# Patient Record
Sex: MALE | Race: WHITE | HISPANIC OR LATINO | URBAN - METROPOLITAN AREA
[De-identification: names, ages, dates, MRNs, and addresses within clinical notes are randomized per-mention and may not be internally consistent; named-entity substitution may affect disease eponyms.]

---

## 2021-06-23 ENCOUNTER — EMERGENCY (EMERGENCY)
Facility: HOSPITAL | Age: 81
LOS: 1 days | Discharge: HOME | End: 2021-06-23
Attending: EMERGENCY MEDICINE | Admitting: EMERGENCY MEDICINE
Payer: MEDICARE

## 2021-06-23 ENCOUNTER — INPATIENT (INPATIENT)
Facility: HOSPITAL | Age: 81
LOS: 1 days | Discharge: HOME | DRG: 343 | End: 2021-06-25
Attending: SURGERY | Admitting: SURGERY
Payer: MEDICARE

## 2021-06-23 VITALS
RESPIRATION RATE: 16 BRPM | TEMPERATURE: 98 F | DIASTOLIC BLOOD PRESSURE: 100 MMHG | SYSTOLIC BLOOD PRESSURE: 141 MMHG | HEIGHT: 67 IN | WEIGHT: 201.06 LBS | OXYGEN SATURATION: 98 % | HEART RATE: 106 BPM

## 2021-06-23 VITALS
OXYGEN SATURATION: 98 % | HEART RATE: 98 BPM | SYSTOLIC BLOOD PRESSURE: 158 MMHG | RESPIRATION RATE: 16 BRPM | TEMPERATURE: 99 F | DIASTOLIC BLOOD PRESSURE: 99 MMHG

## 2021-06-23 VITALS
OXYGEN SATURATION: 97 % | RESPIRATION RATE: 16 BRPM | DIASTOLIC BLOOD PRESSURE: 88 MMHG | HEART RATE: 103 BPM | HEIGHT: 67 IN | SYSTOLIC BLOOD PRESSURE: 135 MMHG | TEMPERATURE: 98 F | WEIGHT: 201.94 LBS

## 2021-06-23 DIAGNOSIS — Z85.46 PERSONAL HISTORY OF MALIGNANT NEOPLASM OF PROSTATE: Chronic | ICD-10-CM

## 2021-06-23 DIAGNOSIS — Z98.890 OTHER SPECIFIED POSTPROCEDURAL STATES: Chronic | ICD-10-CM

## 2021-06-23 DIAGNOSIS — E78.5 HYPERLIPIDEMIA, UNSPECIFIED: ICD-10-CM

## 2021-06-23 DIAGNOSIS — K35.30 ACUTE APPENDICITIS WITH LOCALIZED PERITONITIS, WITHOUT PERFORATION OR GANGRENE: ICD-10-CM

## 2021-06-23 DIAGNOSIS — R10.31 RIGHT LOWER QUADRANT PAIN: ICD-10-CM

## 2021-06-23 DIAGNOSIS — I10 ESSENTIAL (PRIMARY) HYPERTENSION: ICD-10-CM

## 2021-06-23 DIAGNOSIS — Z20.822 CONTACT WITH AND (SUSPECTED) EXPOSURE TO COVID-19: ICD-10-CM

## 2021-06-23 LAB
ALBUMIN SERPL ELPH-MCNC: 4.1 G/DL — SIGNIFICANT CHANGE UP (ref 3.4–5)
ALP SERPL-CCNC: 81 U/L — SIGNIFICANT CHANGE UP (ref 40–120)
ALT FLD-CCNC: 41 U/L — SIGNIFICANT CHANGE UP (ref 12–42)
ANION GAP SERPL CALC-SCNC: 8 MMOL/L — LOW (ref 9–16)
APPEARANCE UR: CLEAR — SIGNIFICANT CHANGE UP
APTT BLD: 42.4 SEC — HIGH (ref 27.5–35.5)
AST SERPL-CCNC: 23 U/L — SIGNIFICANT CHANGE UP (ref 15–37)
BILIRUB SERPL-MCNC: 0.8 MG/DL — SIGNIFICANT CHANGE UP (ref 0.2–1.2)
BILIRUB UR-MCNC: NEGATIVE — SIGNIFICANT CHANGE UP
BLD GP AB SCN SERPL QL: NEGATIVE — SIGNIFICANT CHANGE UP
BUN SERPL-MCNC: 16 MG/DL — SIGNIFICANT CHANGE UP (ref 7–23)
CALCIUM SERPL-MCNC: 9.3 MG/DL — SIGNIFICANT CHANGE UP (ref 8.5–10.5)
CHLORIDE SERPL-SCNC: 105 MMOL/L — SIGNIFICANT CHANGE UP (ref 96–108)
CO2 SERPL-SCNC: 28 MMOL/L — SIGNIFICANT CHANGE UP (ref 22–31)
COLOR SPEC: YELLOW — SIGNIFICANT CHANGE UP
CREAT SERPL-MCNC: 1.03 MG/DL — SIGNIFICANT CHANGE UP (ref 0.5–1.3)
DIFF PNL FLD: NEGATIVE — SIGNIFICANT CHANGE UP
GLUCOSE SERPL-MCNC: 105 MG/DL — HIGH (ref 70–99)
GLUCOSE UR QL: NEGATIVE — SIGNIFICANT CHANGE UP
HCT VFR BLD CALC: 53 % — HIGH (ref 39–50)
HGB BLD-MCNC: 17.8 G/DL — HIGH (ref 13–17)
INR BLD: 1.04 — SIGNIFICANT CHANGE UP (ref 0.88–1.16)
KETONES UR-MCNC: NEGATIVE — SIGNIFICANT CHANGE UP
LEUKOCYTE ESTERASE UR-ACNC: NEGATIVE — SIGNIFICANT CHANGE UP
MCHC RBC-ENTMCNC: 30.4 PG — SIGNIFICANT CHANGE UP (ref 27–34)
MCHC RBC-ENTMCNC: 33.6 GM/DL — SIGNIFICANT CHANGE UP (ref 32–36)
MCV RBC AUTO: 90.4 FL — SIGNIFICANT CHANGE UP (ref 80–100)
NITRITE UR-MCNC: NEGATIVE — SIGNIFICANT CHANGE UP
NRBC # BLD: 0 /100 WBCS — SIGNIFICANT CHANGE UP (ref 0–0)
PH UR: 6 — SIGNIFICANT CHANGE UP (ref 5–8)
PLATELET # BLD AUTO: 291 K/UL — SIGNIFICANT CHANGE UP (ref 150–400)
POTASSIUM SERPL-MCNC: 4.3 MMOL/L — SIGNIFICANT CHANGE UP (ref 3.5–5.3)
POTASSIUM SERPL-SCNC: 4.3 MMOL/L — SIGNIFICANT CHANGE UP (ref 3.5–5.3)
PROT SERPL-MCNC: 7.9 G/DL — SIGNIFICANT CHANGE UP (ref 6.4–8.2)
PROT UR-MCNC: NEGATIVE MG/DL — SIGNIFICANT CHANGE UP
PROTHROM AB SERPL-ACNC: 12.3 SEC — SIGNIFICANT CHANGE UP (ref 10.6–13.6)
RBC # BLD: 5.86 M/UL — HIGH (ref 4.2–5.8)
RBC # FLD: 15.1 % — HIGH (ref 10.3–14.5)
RH IG SCN BLD-IMP: POSITIVE — SIGNIFICANT CHANGE UP
RH IG SCN BLD-IMP: POSITIVE — SIGNIFICANT CHANGE UP
SARS-COV-2 RNA SPEC QL NAA+PROBE: SIGNIFICANT CHANGE UP
SODIUM SERPL-SCNC: 141 MMOL/L — SIGNIFICANT CHANGE UP (ref 132–145)
SP GR SPEC: <=1.005 — SIGNIFICANT CHANGE UP (ref 1–1.03)
UROBILINOGEN FLD QL: 0.2 E.U./DL — SIGNIFICANT CHANGE UP
WBC # BLD: 11.65 K/UL — HIGH (ref 3.8–10.5)
WBC # FLD AUTO: 11.65 K/UL — HIGH (ref 3.8–10.5)

## 2021-06-23 PROCEDURE — 99284 EMERGENCY DEPT VISIT MOD MDM: CPT

## 2021-06-23 PROCEDURE — 71045 X-RAY EXAM CHEST 1 VIEW: CPT | Mod: 26

## 2021-06-23 PROCEDURE — 99285 EMERGENCY DEPT VISIT HI MDM: CPT

## 2021-06-23 PROCEDURE — 74177 CT ABD & PELVIS W/CONTRAST: CPT | Mod: 26

## 2021-06-23 RX ORDER — SODIUM CHLORIDE 9 MG/ML
500 INJECTION INTRAMUSCULAR; INTRAVENOUS; SUBCUTANEOUS ONCE
Refills: 0 | Status: COMPLETED | OUTPATIENT
Start: 2021-06-23 | End: 2021-06-23

## 2021-06-23 RX ORDER — SODIUM CHLORIDE 9 MG/ML
1000 INJECTION, SOLUTION INTRAVENOUS
Refills: 0 | Status: DISCONTINUED | OUTPATIENT
Start: 2021-06-23 | End: 2021-06-25

## 2021-06-23 RX ORDER — METRONIDAZOLE 500 MG
TABLET ORAL
Refills: 0 | Status: DISCONTINUED | OUTPATIENT
Start: 2021-06-24 | End: 2021-06-24

## 2021-06-23 RX ORDER — CEFTRIAXONE 500 MG/1
1000 INJECTION, POWDER, FOR SOLUTION INTRAMUSCULAR; INTRAVENOUS ONCE
Refills: 0 | Status: COMPLETED | OUTPATIENT
Start: 2021-06-23 | End: 2021-06-23

## 2021-06-23 RX ORDER — PIPERACILLIN AND TAZOBACTAM 4; .5 G/20ML; G/20ML
3.38 INJECTION, POWDER, LYOPHILIZED, FOR SOLUTION INTRAVENOUS ONCE
Refills: 0 | Status: COMPLETED | OUTPATIENT
Start: 2021-06-23 | End: 2021-06-23

## 2021-06-23 RX ORDER — IRBESARTAN 75 MG/1
1 TABLET ORAL
Qty: 0 | Refills: 0 | DISCHARGE

## 2021-06-23 RX ORDER — IOHEXOL 300 MG/ML
30 INJECTION, SOLUTION INTRAVENOUS ONCE
Refills: 0 | Status: COMPLETED | OUTPATIENT
Start: 2021-06-23 | End: 2021-06-23

## 2021-06-23 RX ORDER — AMLODIPINE BESYLATE 2.5 MG/1
1 TABLET ORAL
Qty: 0 | Refills: 0 | DISCHARGE

## 2021-06-23 RX ORDER — HYDROMORPHONE HYDROCHLORIDE 2 MG/ML
0.5 INJECTION INTRAMUSCULAR; INTRAVENOUS; SUBCUTANEOUS EVERY 6 HOURS
Refills: 0 | Status: DISCONTINUED | OUTPATIENT
Start: 2021-06-23 | End: 2021-06-25

## 2021-06-23 RX ORDER — METRONIDAZOLE 500 MG
500 TABLET ORAL EVERY 8 HOURS
Refills: 0 | Status: DISCONTINUED | OUTPATIENT
Start: 2021-06-24 | End: 2021-06-24

## 2021-06-23 RX ORDER — ROSUVASTATIN CALCIUM 5 MG/1
1 TABLET ORAL
Qty: 0 | Refills: 0 | DISCHARGE

## 2021-06-23 RX ORDER — ACETAMINOPHEN 500 MG
650 TABLET ORAL EVERY 6 HOURS
Refills: 0 | Status: DISCONTINUED | OUTPATIENT
Start: 2021-06-23 | End: 2021-06-25

## 2021-06-23 RX ORDER — CEFTRIAXONE 500 MG/1
INJECTION, POWDER, FOR SOLUTION INTRAMUSCULAR; INTRAVENOUS
Refills: 0 | Status: DISCONTINUED | OUTPATIENT
Start: 2021-06-23 | End: 2021-06-24

## 2021-06-23 RX ORDER — CEFTRIAXONE 500 MG/1
1000 INJECTION, POWDER, FOR SOLUTION INTRAMUSCULAR; INTRAVENOUS EVERY 24 HOURS
Refills: 0 | Status: DISCONTINUED | OUTPATIENT
Start: 2021-06-24 | End: 2021-06-24

## 2021-06-23 RX ORDER — METRONIDAZOLE 500 MG
500 TABLET ORAL ONCE
Refills: 0 | Status: COMPLETED | OUTPATIENT
Start: 2021-06-23 | End: 2021-06-24

## 2021-06-23 RX ORDER — ONDANSETRON 8 MG/1
4 TABLET, FILM COATED ORAL EVERY 6 HOURS
Refills: 0 | Status: DISCONTINUED | OUTPATIENT
Start: 2021-06-23 | End: 2021-06-25

## 2021-06-23 RX ADMIN — SODIUM CHLORIDE 500 MILLILITER(S): 9 INJECTION INTRAMUSCULAR; INTRAVENOUS; SUBCUTANEOUS at 13:14

## 2021-06-23 RX ADMIN — IOHEXOL 30 MILLILITER(S): 300 INJECTION, SOLUTION INTRAVENOUS at 13:14

## 2021-06-23 RX ADMIN — PIPERACILLIN AND TAZOBACTAM 200 GRAM(S): 4; .5 INJECTION, POWDER, LYOPHILIZED, FOR SOLUTION INTRAVENOUS at 16:48

## 2021-06-23 NOTE — ED PROVIDER NOTE - PATIENT PORTAL LINK FT
You can access the FollowMyHealth Patient Portal offered by [facility] by registering at the following website: [email or web address]. By joining [facility]’s FollowMyHealth portal, you will also be able to view your health information using other applications (apps) compatible with our system.

## 2021-06-23 NOTE — ED PROVIDER NOTE - NSFOLLOWUPINSTRUCTIONS_ED_ALL_ED_FT
Please go directly to the ER at [address] between [place] and [facility].  Go right away.  You have appendicitis and if the appendix bursts, it could kill you.  If you can't go to [facility], go to the nearest ER.

## 2021-06-23 NOTE — ED PROVIDER NOTE - CLINICAL SUMMARY MEDICAL DECISION MAKING FREE TEXT BOX
Patient presents to the ED complaining of RLQ abdominal pain. Patient was told to come to the ED by Dr. Spencer. Patient presents to the ED complaining of RLQ abdominal pain. Patient was told to come to the ED by Dr. Spencer to evaluate for appendicitis. Will perform CT scan. Patient presents to the ED complaining of RLQ abdominal pain. Patient was told to come to the ED by Dr. Spencer to evaluate for appendicitis. Acute appendicitis on CT scan, patient states he must move his car and will go to [facility] afterward, patient lives in [place].  Risks and alternatives d/w patient and he understands the need for surgery and risk of death if he does not go to [facility] after dropping his car off and he states he will leave against medical advice.

## 2021-06-23 NOTE — ED ADULT NURSE NOTE - NSIMPLEMENTINTERV_GEN_ALL_ED
Implemented All Universal Safety Interventions:  [place] to call system. Call bell, personal items and telephone within reach. Instruct patient to call for assistance. Room bathroom lighting operational. Non-slip footwear when patient is off stretcher. Physically safe environment: no spills, clutter or unnecessary equipment. Stretcher in lowest position, wheels locked, appropriate side rails in place.

## 2021-06-23 NOTE — H&P ADULT - NSHPPHYSICALEXAM_GEN_ALL_CORE
VITALS:    T(F): 98.2 (06-23-21 @ 19:40), Max: 98.6 (06-23-21 @ 16:53)  HR: 106 (06-23-21 @ 19:40) (95 - 106)  BP: 141/100 (06-23-21 @ 19:40) (135/88 - 158/99)  RR: 16 (06-23-21 @ 19:40) (16 - 18)  SpO2: 98% (06-23-21 @ 19:40) (97% - 98%)  Wt(kg): --    I&O's Summary      GENERAL: NAD, Resting comfortably in bed, awake, opens eyes spontaneously  HEENT: NCAT, MMM, Normal conjunctiva, PERRL  RESP: Nonlabored breathing, No respiratory distress  CARD: Normal rate, Normal peripheral perfusion  GI: Soft, minimally distended, TTP in RLQ>LLQ. No Rovsing's. No guarding, No rebound tenderness  EXTREM: WWP, No edema, No gross deformity of extremities  SKIN: No rashes, no lesions  NEURO: AAOx3, No focal motor or sensory deficits  PSYCH: Affect and characteristics of appearance, verbalizations, and behaviors are appropriate

## 2021-06-23 NOTE — ED PROVIDER NOTE - OBJECTIVE STATEMENT
Patient presents to the ED complaining of RLQ abdominal pain. Patient was told to come to the ED by Dr. Spencer. Patient presents to the ED complaining of RLQ abdominal pain. Patient was told to come to the ED by Dr. Spencer to evaluate for appendicitis. 81 y/o male with PMHx of HTN presents to the ED complaining of RLQ abdominal pain. Patient was told to come to the ED by Dr. Spencer to evaluate for appendicitis. Patient has PSHx of hernia surgery at [facility] 1 year ago.

## 2021-06-23 NOTE — ED ADULT TRIAGE NOTE - CHIEF COMPLAINT QUOTE
Pt sent in from [facility] for confirmed appendicitis. Pt reports RLQ pain since last night. Denies n/v/d, fevers. Hx of prostate cancer.

## 2021-06-23 NOTE — ED ADULT NURSE NOTE - NSIMPLEMENTINTERV_GEN_ALL_ED
Implemented All Universal Safety Interventions:  [place] to call system. Call bell, personal items and telephone within reach. Instruct patient to call for assistance. Room bathroom lighting operational. Non-slip footwear when patient is off stretcher. Physically safe environment: no spills, clutter or unnecessary equipment. Stretcher in lowest position, wheels locked, appropriate side rails in place.

## 2021-06-23 NOTE — H&P ADULT - NSHPLABSRESULTS_GEN_ALL_CORE
LABS:                        17.8   11.65 )-----------( 291      ( 23 Jun 2021 13:11 )             53.0     06-23    141  |  105  |  16  ----------------------------<  105<H>  4.3   |  28  |  1.03    Ca    9.3      23 Jun 2021 13:35    TPro  7.9  /  Alb  4.1  /  TBili  0.8  /  DBili  x   /  AST  23  /  ALT  41  /  AlkPhos  81  06-23    PT/INR - ( 23 Jun 2021 13:35 )   PT: 12.3 sec;   INR: 1.04          PTT - ( 23 Jun 2021 13:35 )  PTT:42.4 sec  Urinalysis Basic - ( 23 Jun 2021 14:36 )      RADIOLOGY & ADDITIONAL TESTS:    Color: Yellow / Appearance: Clear / SG: <=1.005 / pH: x  Gluc: x / Ketone: NEGATIVE  / Bili: NEGATIVE / Urobili: 0.2 E.U./dL   Blood: x / Protein: NEGATIVE mg/dL / Nitrite: NEGATIVE   Leuk Esterase: NEGATIVE / RBC: x / WBC x   Sq Epi: x / Non Sq Epi: x / Bacteria: x

## 2021-06-23 NOTE — H&P ADULT - NSHPSOCIALHISTORY_GEN_ALL_CORE
1-2 drinks of wine/month, 1/2 ppd of cigarettes (quit 3 weeks ago), daily marijuana use (quit 3 weeks ago)

## 2021-06-23 NOTE — H&P ADULT - HISTORY OF PRESENT ILLNESS
INTERVAL HPI/OVERNIGHT EVENTS:  No acute events overnight.    VITALS:    T(F): 98.2 (06-23-21 @ 19:40), Max: 98.6 (06-23-21 @ 16:53)  HR: 106 (06-23-21 @ 19:40) (95 - 106)  BP: 141/100 (06-23-21 @ 19:40) (135/88 - 158/99)  RR: 16 (06-23-21 @ 19:40) (16 - 18)  SpO2: 98% (06-23-21 @ 19:40) (97% - 98%)  Wt(kg): --    I&O's Detail      MEDICATIONS:    ANTIBIOTICS:      PAIN CONTROL:       MEDS:      HEME/ONC        PHYSICAL EXAM:  General: No acute distress.  Alert and Oriented  Abdominal Exam:   Exam:      LABS:                        17.8   11.65 )-----------( 291      ( 23 Jun 2021 13:11 )             53.0     06-23    141  |  105  |  16  ----------------------------<  105<H>  4.3   |  28  |  1.03    Ca    9.3      23 Jun 2021 13:35    TPro  7.9  /  Alb  4.1  /  TBili  0.8  /  DBili  x   /  AST  23  /  ALT  41  /  AlkPhos  81  06-23    PT/INR - ( 23 Jun 2021 13:35 )   PT: 12.3 sec;   INR: 1.04          PTT - ( 23 Jun 2021 13:35 )  PTT:42.4 sec  Urinalysis Basic - ( 23 Jun 2021 14:36 )    Color: Yellow / Appearance: Clear / SG: <=1.005 / pH: x  Gluc: x / Ketone: NEGATIVE  / Bili: NEGATIVE / Urobili: 0.2 E.U./dL   Blood: x / Protein: NEGATIVE mg/dL / Nitrite: NEGATIVE   Leuk Esterase: NEGATIVE / RBC: x / WBC x   Sq Epi: x / Non Sq Epi: x / Bacteria: x        RADIOLOGY & ADDITIONAL TESTS:    ASSESSMENT: 80yMale s/p     PLAN:  Diet: clears  Pain control  Monitor Urine Output  DVT ppx  Cont Abx  OOB/IS   Jack Patel is a 80M with a pmh of HTN, HLD, COPD (not on home O2), diverticulitis (last attack 7 years ago) and prostate ca s/p total prostatectomy (~25years ago) and psh of umbilical hernia repair with mesh (2 years ago) who presents with 1 day history of RLQ pain with associated anorexia. Reports spontaneous onset of RLQ with radiation to groin yesterday evening while resting. Reports the pain as persistent and achy and persisted into the next morning prompting the patient to call his PCP. PCP advised the pt to visit the ED. Reports that he initially thought the pain was 2/2 to diverticulitis but more severe in nature. Last bowel movement was this AM and was soft; NBNB    VITALS:    T(F): 98.2 (06-23-21 @ 19:40), Max: 98.6 (06-23-21 @ 16:53)  HR: 106 (06-23-21 @ 19:40) (95 - 106)  BP: 141/100 (06-23-21 @ 19:40) (135/88 - 158/99)  RR: 16 (06-23-21 @ 19:40) (16 - 18)  SpO2: 98% (06-23-21 @ 19:40) (97% - 98%)  Wt(kg): --    I&O's Detail      MEDICATIONS:    ANTIBIOTICS:      PAIN CONTROL:       MEDS:      HEME/ONC        PHYSICAL EXAM:  General: No acute distress.  Alert and Oriented  Abdominal Exam:   Exam:      LABS:                        17.8   11.65 )-----------( 291      ( 23 Jun 2021 13:11 )             53.0     06-23    141  |  105  |  16  ----------------------------<  105<H>  4.3   |  28  |  1.03    Ca    9.3      23 Jun 2021 13:35    TPro  7.9  /  Alb  4.1  /  TBili  0.8  /  DBili  x   /  AST  23  /  ALT  41  /  AlkPhos  81  06-23    PT/INR - ( 23 Jun 2021 13:35 )   PT: 12.3 sec;   INR: 1.04          PTT - ( 23 Jun 2021 13:35 )  PTT:42.4 sec  Urinalysis Basic - ( 23 Jun 2021 14:36 )    Color: Yellow / Appearance: Clear / SG: <=1.005 / pH: x  Gluc: x / Ketone: NEGATIVE  / Bili: NEGATIVE / Urobili: 0.2 E.U./dL   Blood: x / Protein: NEGATIVE mg/dL / Nitrite: NEGATIVE   Leuk Esterase: NEGATIVE / RBC: x / WBC x   Sq Epi: x / Non Sq Epi: x / Bacteria: x        RADIOLOGY & ADDITIONAL TESTS:    ASSESSMENT: 80yMale s/p     PLAN:  Diet: clears  Pain control  Monitor Urine Output  DVT ppx  Cont Abx  OOB/IS   Jack Patel is a 80M with a pmh of HTN, HLD, COPD (not on home O2), diverticulitis (last attack 7 years ago) and prostate ca s/p total prostatectomy (~25years ago) and psh of umbilical hernia repair with mesh (2 years ago) who presents with 1 day history of RLQ pain with associated anorexia. Reports spontaneous onset of RLQ with radiation to groin yesterday evening while resting. Reports the pain as persistent and achy and persisted into the next morning prompting the patient to call his PCP. PCP advised the pt to visit the ED. Reports that he initially thought the pain was 2/2 to diverticulitis but more severe in nature. Last bowel movement was this AM and was soft; NBNB. Last colonoscopy was 2 years ago and was notable for 19 polyps that were removed. Denies any associated f/c, n/v, CP, SOB, melena, hematochezia, dysuria, weakness or pain in extremities.     In the ED initial vitals were notable for  otherwise afebrile with SBP wnl. Initial labs notable for WBC 11.6 and Hgb 17.8, otherwise remaining labs wnl. CT A/P notable for several small cysts and additional subcentimeter hypodensities which are too small to characterize. Mild ectasia of the distal descending thoracic aorta measuring up to 3.5 cm diameter. Two distal periesophageal nodes up to 0.7 cm diameter. Appendix is dilated measuring up to 1.1 cm in transverse diameter. There is mild periappendiceal fat stranding. No free air or abscess. There are several nonspecific mildly dilated loops of small bowel measuring up to 3.1 cm without a discrete transition point. Extensive colonic diverticulosis. Periampullary duodenal diverticulum. Was resuscitated with 1L NS and given 3.375mg of zosyn prior to arrival to [facility].    PMH: HTN, HLD, COPD (not on home O2), diverticulitis, and Prostate CA  PSH: Total prostatectomy (~25 years ago), Umbilical hernia repair (~2 years ago)  Allergies: NKDA                 Jack Patel is a 80M with a pmh of HTN, HLD, COPD (not on home O2), diverticulitis (last attack 7 years ago) and prostate ca s/p total prostatectomy (~25years ago) and psh of umbilical hernia repair with mesh (2 years ago) who presents with 1 day history of RLQ pain with associated anorexia. Reports spontaneous onset of RLQ with radiation to groin yesterday evening while resting. Reports the pain as persistent and achy and persisted into the next morning prompting the patient to call his PCP. PCP advised the pt to visit the ED. Reports that he initially thought the pain was 2/2 to diverticulitis but more severe in nature. Last bowel movement was this AM and was soft; NBNB. Last colonoscopy was 2 years ago and was notable for 19 polyps that were removed. Denies any associated f/c, night fevers, unintentional  n/v, CP, SOB, melena, hematochezia, dysuria, weakness or pain in extremities.     In the ED initial vitals were notable for  otherwise afebrile with SBP wnl. Initial labs notable for WBC 11.6 and Hgb 17.8, otherwise remaining labs wnl. CT A/P notable for several small cysts and additional subcentimeter hypodensities which are too small to characterize. Mild ectasia of the distal descending thoracic aorta measuring up to 3.5 cm diameter. Two distal periesophageal nodes up to 0.7 cm diameter. Appendix is dilated measuring up to 1.1 cm in transverse diameter. There is mild periappendiceal fat stranding. No free air or abscess. There are several nonspecific mildly dilated loops of small bowel measuring up to 3.1 cm without a discrete transition point. Extensive colonic diverticulosis. Periampullary duodenal diverticulum. Was resuscitated with 1L NS and given 3.375mg of zosyn prior to arrival to [facility].    PMH: HTN, HLD, COPD (not on home O2), diverticulitis, and Prostate CA  PSH: Total prostatectomy (~25 years ago), Umbilical hernia repair (~2 years ago)  Allergies: NKDA  SocHx: 1-2 drinks of wine/month, 1/2 ppd of cigarettes (quit 3 weeks ago), daily marijuana use (quit 3 weeks ago)  FamHx: No fam history of CRC, IBD, or IBS                 Jack Patel is a 80M with a pmh of HTN, HLD, COPD (not on home O2), diverticulitis (last attack 7 years ago) and prostate ca s/p total prostatectomy (~25years ago) and psh of umbilical hernia repair with mesh (2 years ago) who presents with 1 day history of RLQ pain with associated anorexia. Reports spontaneous onset of RLQ with radiation to groin yesterday evening while resting. Reports the pain as persistent and achy and persisted into the next morning prompting the patient to call his PCP. PCP advised the pt to visit the ED. Reports that he initially thought the pain was 2/2 to diverticulitis but more severe in nature. Last bowel movement was this AM and was soft; NBNB. Last colonoscopy was 2 years ago and was notable for 19 polyps that were removed. Denies any associated f/c, night fevers, unintentional  n/v, CP, SOB, melena, hematochezia, dysuria, weakness or pain in extremities.     In the ED initial vitals were notable for  otherwise afebrile with SBP wnl. Initial labs notable for WBC 11.6 and Hgb 17.8, otherwise remaining labs wnl. CT A/P notable for several small cysts and additional subcentimeter hypodensities which are too small to characterize. Mild ectasia of the distal descending thoracic aorta measuring up to 3.5 cm diameter. Two distal periesophageal nodes up to 0.7 cm diameter. Appendix is dilated measuring up to 1.1 cm in transverse diameter. There is mild periappendiceal fat stranding. No free air or abscess. There are several nonspecific mildly dilated loops of small bowel measuring up to 3.1 cm without a discrete transition point. Extensive colonic diverticulosis. Periampullary duodenal diverticulum. Was resuscitated with 1L NS and given 3.375mg of zosyn prior to arrival to [facility].    PMH: HTN, HLD, COPD (not on home O2), diverticulitis, and Prostate CA  PSH: Total prostatectomy (~25 years ago), Umbilical hernia repair (~2 years ago)  Allergies: NKDA  SocHx: 1-2 drinks of wine/month, 1/2 ppd of cigarettes (quit 3 weeks ago), daily marijuana use (quit 3 weeks ago)  FamHx: No fam history of CRC, IBD, or IBS

## 2021-06-23 NOTE — H&P ADULT - ASSESSMENT
80M with a pmh of HTN, HLD, COPD (not on home O2), diverticulitis (last attack 7 years ago) and prostate ca s/p total prostatectomy (~25years ago) and psh of umbilical hernia repair with mesh (2 years ago) who presents with 1 day history of RLQ pain with associated anorexia. Vitals were notable for  otherwise afebrile with SBP wnl. Initial labs notable for WBC 11.6 and Hgb 17.8, otherwise remaining labs wnl. CT A/P notable for several small cysts and additional subcentimeter hypodensities which are too small to characterize. Mild ectasia of the distal descending thoracic aorta measuring up to 3.5 cm diameter. Two distal periesophageal nodes up to 0.7 cm diameter. Appendix is dilated measuring up to 1.1 cm in transverse diameter. There is mild periappendiceal fat stranding. No free air or abscess. There are several nonspecific mildly dilated loops of small bowel measuring up to 3.1 cm without a discrete transition point. Extensive colonic diverticulosis. Periampullary duodenal diverticulum.        80M with a pmh of HTN, HLD, COPD (not on home O2), diverticulitis (last attack 7 years ago) and prostate ca s/p total prostatectomy (~25years ago) and psh of umbilical hernia repair with mesh (2 years ago) who presents with 1 day history of RLQ pain with associated anorexia. Vitals were notable for  otherwise afebrile with SBP wnl. Initial labs notable for WBC 11.6 and Hgb 17.8, otherwise remaining labs wnl. CT A/P notable for appendix is dilated measuring up to 1.1 cm in transverse diameter. There is mild periappendiceal fat stranding. No free air or abscess. There are several nonspecific mildly dilated loops of small bowel measuring up to 3.1 cm without a discrete transition point. Findings c/w with acute appendicitis.     Admit to general surgery Team 4 under Dr. De La Paz  Add on to OR for laparoscopic appendectomy  NPO/IVF  Roceph/flagyl  Strict I&Os  AM labs   Analgesics PRN  Antiemetics PRN  VTE PPX with SCDs and SQH  Seen and evaluated with chief surgery resident

## 2021-06-23 NOTE — ED PROVIDER NOTE - OBJECTIVE STATEMENT
79 y/o M pt with PMhx of HTN and HLD presents to ED with appendicitis. Pt was complaining of RLQ abdominal pain for the past few days, and was seen at [facility] where he was diagnosed with appendicitis on CT. Pt was given IV Zosyn and instructed to come to [facility] immediately for management. In ED, pt is afebrile, well appearing, and not in any distress.

## 2021-06-23 NOTE — H&P ADULT - NSICDXPASTMEDICALHX_GEN_ALL_CORE_FT
PAST MEDICAL HISTORY:  Diverticulitis     Hernia     High cholesterol     HTN (hypertension)     Prostate cancer

## 2021-06-23 NOTE — ED ADULT NURSE NOTE - CHIEF COMPLAINT QUOTE
Pt sent in from [facility] for confirmed appendicitis. Pt reports RLQ pain since last night. Denies n/v/d, fevers. Hx of prostate cancer.

## 2021-06-24 LAB
ANION GAP SERPL CALC-SCNC: 10 MMOL/L — SIGNIFICANT CHANGE UP (ref 5–17)
BUN SERPL-MCNC: 14 MG/DL — SIGNIFICANT CHANGE UP (ref 7–23)
CALCIUM SERPL-MCNC: 8.6 MG/DL — SIGNIFICANT CHANGE UP (ref 8.4–10.5)
CHLORIDE SERPL-SCNC: 106 MMOL/L — SIGNIFICANT CHANGE UP (ref 96–108)
CO2 SERPL-SCNC: 24 MMOL/L — SIGNIFICANT CHANGE UP (ref 22–31)
COVID-19 SPIKE DOMAIN AB INTERP: POSITIVE
COVID-19 SPIKE DOMAIN ANTIBODY RESULT: >250 U/ML — HIGH
CREAT SERPL-MCNC: 0.96 MG/DL — SIGNIFICANT CHANGE UP (ref 0.5–1.3)
GLUCOSE SERPL-MCNC: 103 MG/DL — HIGH (ref 70–99)
HCT VFR BLD CALC: 51 % — HIGH (ref 39–50)
HGB BLD-MCNC: 16.6 G/DL — SIGNIFICANT CHANGE UP (ref 13–17)
MAGNESIUM SERPL-MCNC: 2.2 MG/DL — SIGNIFICANT CHANGE UP (ref 1.6–2.6)
MCHC RBC-ENTMCNC: 30 PG — SIGNIFICANT CHANGE UP (ref 27–34)
MCHC RBC-ENTMCNC: 32.5 GM/DL — SIGNIFICANT CHANGE UP (ref 32–36)
MCV RBC AUTO: 92.2 FL — SIGNIFICANT CHANGE UP (ref 80–100)
NRBC # BLD: 0 /100 WBCS — SIGNIFICANT CHANGE UP (ref 0–0)
PHOSPHATE SERPL-MCNC: 3 MG/DL — SIGNIFICANT CHANGE UP (ref 2.5–4.5)
PLATELET # BLD AUTO: 268 K/UL — SIGNIFICANT CHANGE UP (ref 150–400)
POTASSIUM SERPL-MCNC: 4.6 MMOL/L — SIGNIFICANT CHANGE UP (ref 3.5–5.3)
POTASSIUM SERPL-SCNC: 4.6 MMOL/L — SIGNIFICANT CHANGE UP (ref 3.5–5.3)
RBC # BLD: 5.53 M/UL — SIGNIFICANT CHANGE UP (ref 4.2–5.8)
RBC # FLD: 15.4 % — HIGH (ref 10.3–14.5)
SARS-COV-2 IGG+IGM SERPL QL IA: >250 U/ML — HIGH
SARS-COV-2 IGG+IGM SERPL QL IA: POSITIVE
SODIUM SERPL-SCNC: 140 MMOL/L — SIGNIFICANT CHANGE UP (ref 135–145)
WBC # BLD: 9.15 K/UL — SIGNIFICANT CHANGE UP (ref 3.8–10.5)
WBC # FLD AUTO: 9.15 K/UL — SIGNIFICANT CHANGE UP (ref 3.8–10.5)

## 2021-06-24 PROCEDURE — 88304 TISSUE EXAM BY PATHOLOGIST: CPT | Mod: 26

## 2021-06-24 PROCEDURE — 99222 1ST HOSP IP/OBS MODERATE 55: CPT

## 2021-06-24 RX ORDER — HEPARIN SODIUM 5000 [USP'U]/ML
5000 INJECTION INTRAVENOUS; SUBCUTANEOUS EVERY 8 HOURS
Refills: 0 | Status: DISCONTINUED | OUTPATIENT
Start: 2021-06-25 | End: 2021-06-25

## 2021-06-24 RX ORDER — AMLODIPINE BESYLATE 2.5 MG/1
5 TABLET ORAL DAILY
Refills: 0 | Status: DISCONTINUED | OUTPATIENT
Start: 2021-06-24 | End: 2021-06-25

## 2021-06-24 RX ORDER — ATORVASTATIN CALCIUM 80 MG/1
20 TABLET, FILM COATED ORAL AT BEDTIME
Refills: 0 | Status: DISCONTINUED | OUTPATIENT
Start: 2021-06-24 | End: 2021-06-25

## 2021-06-24 RX ORDER — HEPARIN SODIUM 5000 [USP'U]/ML
5000 INJECTION INTRAVENOUS; SUBCUTANEOUS EVERY 8 HOURS
Refills: 0 | Status: DISCONTINUED | OUTPATIENT
Start: 2021-06-24 | End: 2021-06-24

## 2021-06-24 RX ORDER — OXYCODONE HYDROCHLORIDE 5 MG/1
5 TABLET ORAL EVERY 6 HOURS
Refills: 0 | Status: DISCONTINUED | OUTPATIENT
Start: 2021-06-24 | End: 2021-06-25

## 2021-06-24 RX ADMIN — Medication 100 MILLIGRAM(S): at 05:10

## 2021-06-24 RX ADMIN — HEPARIN SODIUM 5000 UNIT(S): 5000 INJECTION INTRAVENOUS; SUBCUTANEOUS at 23:46

## 2021-06-24 RX ADMIN — SODIUM CHLORIDE 130 MILLILITER(S): 9 INJECTION, SOLUTION INTRAVENOUS at 16:20

## 2021-06-24 RX ADMIN — SODIUM CHLORIDE 130 MILLILITER(S): 9 INJECTION, SOLUTION INTRAVENOUS at 00:51

## 2021-06-24 RX ADMIN — Medication 100 MILLIGRAM(S): at 00:51

## 2021-06-24 RX ADMIN — CEFTRIAXONE 1000 MILLIGRAM(S): 500 INJECTION, POWDER, FOR SOLUTION INTRAMUSCULAR; INTRAVENOUS at 03:53

## 2021-06-24 RX ADMIN — ATORVASTATIN CALCIUM 20 MILLIGRAM(S): 80 TABLET, FILM COATED ORAL at 20:24

## 2021-06-24 NOTE — CONSULT NOTE ADULT - SUBJECTIVE AND OBJECTIVE BOX
Patient is a 80y old  Male who presents with a chief complaint of Acute appendicitis (23 Jun 2021 20:56)    HPI: 80M with a pmh of HTN, HLD, COPD (not on home O2), diverticulitis (last attack 7 years ago) and prostate ca s/p total prostatectomy (~25years ago) and psh of umbilical hernia repair with mesh (2 years ago) who presents with 1 day history of RLQ pain with associated anorexia. Reports spontaneous onset of RLQ with radiation to groin yesterday evening while resting. Reports the pain as persistent and achy and persisted into the next morning prompting the patient to call his PCP. PCP advised the pt to visit the ED. Reports that he initially thought the pain was 2/2 to diverticulitis but more severe in nature. Last bowel movement was this AM and was soft; NBNB. Last colonoscopy was 2 years ago and was notable for 19 polyps that were removed. Denies any associated f/c, night fevers, unintentional  n/v, CP, SOB, melena, hematochezia, dysuria, weakness or pain in extremities.     In the ED initial vitals were notable for  otherwise afebrile with SBP wnl. Initial labs notable for WBC 11.6 and Hgb 17.8, otherwise remaining labs wnl. CT A/P notable for several small cysts and additional subcentimeter hypodensities which are too small to characterize. Mild ectasia of the distal descending thoracic aorta measuring up to 3.5 cm diameter. Two distal periesophageal nodes up to 0.7 cm diameter. Appendix is dilated measuring up to 1.1 cm in transverse diameter. There is mild periappendiceal fat stranding. No free air or abscess. There are several nonspecific mildly dilated loops of small bowel measuring up to 3.1 cm without a discrete transition point. Extensive colonic diverticulosis. Periampullary duodenal diverticulum. Was resuscitated with 1L NS and given 3.375mg of zosyn prior to arrival to [facility].      INTERVAL HPI/OVERNIGHT EVENTS: Patient was seen and examined at bedside.no o/n events, patient resting comfortably. No complaints at this time except RLQ pain. Patient denies: fever, chills, dizziness, weakness, HA, Changes in vision, CP, palpitations, SOB, cough, N/V/D/C, dysuria, changes in bowel movements, LE edema.    Excellent exercise tolerance, can perform >4 METS.     Denies hx of CAD, CKD, CVA, HF, IDDM.     No hx of COPD exacerbations, not on home O2.       PAST MEDICAL & SURGICAL HISTORY:  High cholesterol    HTN (hypertension)    Prostate cancer    Hernia    Diverticulitis    H/O prostate cancer    H/O hernia repair        SOCIAL HISTORY  Alcohol: 1-2 drinks/month  Tobacco: quit smoking 3 weeks ago, was 1/2 PPD smoker  Illicit substance use: daily marijuana but quit 3 weeks ago      FAMILY HISTORY: no FH of IBD    REVIEW OF SYSTEMS:  CONSTITUTIONAL: No fever, weight loss, or fatigue  EYES: No eye pain, visual disturbances, or discharge  ENMT:  No difficulty hearing, tinnitus, vertigo; No sinus or throat pain  NECK: No pain or stiffness  RESPIRATORY: No cough, wheezing, chills or hemoptysis; No shortness of breath  CARDIOVASCULAR: No chest pain, palpitations, dizziness, or leg swelling  GENITOURINARY: No dysuria, frequency, hematuria, or incontinence  NEUROLOGICAL: No headaches, memory loss, loss of strength, numbness, or tremors  SKIN: No itching, burning, rashes, or lesions   LYMPH NODES: No enlarged glands  ENDOCRINE: No heat or cold intolerance; No hair loss  MUSCULOSKELETAL: No joint pain or swelling; No muscle, back, or extremity pain  PSYCHIATRIC: No depression, anxiety, mood swings, or difficulty sleeping  HEME/LYMPH: No easy bruising, or bleeding gums  ALLERGY AND IMMUNOLOGIC: No hives or eczema    T(C): 36.7 (06-24-21 @ 04:44), Max: 37 (06-23-21 @ 16:53)  HR: 75 (06-24-21 @ 04:44) (75 - 106)  BP: 124/82 (06-24-21 @ 04:44) (124/82 - 158/99)  RR: 18 (06-24-21 @ 04:44) (16 - 18)  SpO2: 95% (06-24-21 @ 04:44) (95% - 98%)  Wt(kg): --  I&O's Summary    23 Jun 2021 07:01  -  24 Jun 2021 07:00  --------------------------------------------------------  IN: 1340 mL / OUT: 750 mL / NET: 590 mL        PHYSICAL EXAM:  GENERAL: NAD, sitting up in bed  HEAD:  Atraumatic, Normocephalic  EYES: EOMI, PERRLA,   ENMT: mildly dry MM  NECK: Supple, No JVD  NERVOUS SYSTEM:  Alert & Oriented X3, no focal deficits   CHEST/LUNG: Clear to percussion bilaterally; No rales, rhonchi, wheezing, or rubs  HEART: Regular rate and rhythm; No murmurs, rubs, or gallops  ABDOMEN: Soft, tenderness RLQ, soft  EXTREMITIES:  no edema        LABS:                        16.6   9.15  )-----------( 268      ( 24 Jun 2021 08:32 )             51.0     06-24    140  |  106  |  14  ----------------------------<  103<H>  4.6   |  24  |  0.96    Ca    8.6      24 Jun 2021 08:32  Phos  3.0     06-24  Mg     2.2     06-24    TPro  7.9  /  Alb  4.1  /  TBili  0.8  /  DBili  x   /  AST  23  /  ALT  41  /  AlkPhos  81  06-23    PT/INR - ( 23 Jun 2021 13:35 )   PT: 12.3 sec;   INR: 1.04          PTT - ( 23 Jun 2021 13:35 )  PTT:42.4 sec  Urinalysis Basic - ( 23 Jun 2021 14:36 )    Color: Yellow / Appearance: Clear / SG: <=1.005 / pH: x  Gluc: x / Ketone: NEGATIVE  / Bili: NEGATIVE / Urobili: 0.2 E.U./dL   Blood: x / Protein: NEGATIVE mg/dL / Nitrite: NEGATIVE   Leuk Esterase: NEGATIVE / RBC: x / WBC x   Sq Epi: x / Non Sq Epi: x / Bacteria: x      CAPILLARY BLOOD GLUCOSE            Urinalysis Basic - ( 23 Jun 2021 14:36 )    Color: Yellow / Appearance: Clear / SG: <=1.005 / pH: x  Gluc: x / Ketone: NEGATIVE  / Bili: NEGATIVE / Urobili: 0.2 E.U./dL   Blood: x / Protein: NEGATIVE mg/dL / Nitrite: NEGATIVE   Leuk Esterase: NEGATIVE / RBC: x / WBC x   Sq Epi: x / Non Sq Epi: x / Bacteria: x        MEDICATIONS  (STANDING):  cefTRIAXone Injectable. 1000 milliGRAM(s) IV Push every 24 hours  cefTRIAXone Injectable.      heparin   Injectable 5000 Unit(s) SubCutaneous every 8 hours  lactated ringers. 1000 milliLiter(s) (130 mL/Hr) IV Continuous <Continuous>  metroNIDAZOLE  IVPB 500 milliGRAM(s) IV Intermittent every 8 hours  metroNIDAZOLE  IVPB        MEDICATIONS  (PRN):  acetaminophen   Tablet .. 650 milliGRAM(s) Oral every 6 hours PRN Mild Pain (1 - 3)  HYDROmorphone  Injectable 0.5 milliGRAM(s) IV Push every 6 hours PRN Severe Pain (7 - 10)  ondansetron Injectable 4 milliGRAM(s) IV Push every 6 hours PRN Nausea      RADIOLOGY & ADDITIONAL TESTS:    Imaging Personally Reviewed:  [ ] YES  [ ] NO    Consultant(s) Notes Reviewed:  [ ] YES  [ ] NO    Care Discussed with Consultants/Other Providers [ ] YES  [ ] NO

## 2021-06-24 NOTE — PRE-OP CHECKLIST - TEMPERATURE IN FAHRENHEIT (DEGREES F)
(2) Severe aphasia; all communication is through fragmentary expression; great need for inference, questioning, and guessing by the listener. Range of information that can be exchanged is limited; listener carries burden of communication. Examiner cannot identify materials provided from patient response. 98.1

## 2021-06-24 NOTE — BRIEF OPERATIVE NOTE - OPERATION/FINDINGS
Veress needle used to create pneumoperitoneum. Ports placed, avoiding area of prior mesh. Appendix identified, with many adhesions to anterior abdominal wall. TI, cecum also identified. Adhesions ligated using cautery. Appendix ligated with 3 vicryl endoloop and cut, placed in endocatch bag and removed from abdomen. Abdomen irrigated. Hemostasis confirmed. 10 mm port site fascia closed with endoclose vicryl figure of 8. Skin closed with monocryl and dermabond. Local anesthetic administered.

## 2021-06-24 NOTE — CONSULT NOTE ADULT - ASSESSMENT
80 year old M w/ PMH of COPD, HTN, HLD, prostate cancer s/p prostatectomy, umbilical hernia repair last year p/w acute appendicitis, scheduled for OR this afternoon.     #Pre-op: RCRI class I risk, EKG reviewed, can perform >4 METS, can proceed to surgery, hold ARB prior to surgery, restart ASA when safe from surgical perspective  #Acute appendicitis: on ceft/flagyl, leukocytosis downtrending, for OR, care per surgery  #HTN: hold ARB, norvasc held as well, can restart after OR  #HLD: c/w statin  #Prostate cancer: s/p prostatectomy  #Polycythemia: resolved, likely combination of dehydration and possible chronic hypoxia from COPD  #COPD: restart spiriva, duonebs prn  #Dispo: for OR today oral

## 2021-06-24 NOTE — CONSULT NOTE ADULT - TIME BILLING
Time spent discussing care with primary team. Greater than 50 minutes spent on total encounter; more than 50% of the visit was spent counseling and/or coordinating care by the attending physician.

## 2021-06-25 VITALS
SYSTOLIC BLOOD PRESSURE: 116 MMHG | HEART RATE: 74 BPM | TEMPERATURE: 98 F | DIASTOLIC BLOOD PRESSURE: 71 MMHG | RESPIRATION RATE: 14 BRPM | OXYGEN SATURATION: 96 %

## 2021-06-25 LAB
ANION GAP SERPL CALC-SCNC: 14 MMOL/L — SIGNIFICANT CHANGE UP (ref 5–17)
BUN SERPL-MCNC: 18 MG/DL — SIGNIFICANT CHANGE UP (ref 7–23)
CALCIUM SERPL-MCNC: 8.9 MG/DL — SIGNIFICANT CHANGE UP (ref 8.4–10.5)
CHLORIDE SERPL-SCNC: 104 MMOL/L — SIGNIFICANT CHANGE UP (ref 96–108)
CO2 SERPL-SCNC: 21 MMOL/L — LOW (ref 22–31)
CREAT SERPL-MCNC: 0.89 MG/DL — SIGNIFICANT CHANGE UP (ref 0.5–1.3)
GLUCOSE SERPL-MCNC: 127 MG/DL — HIGH (ref 70–99)
HCT VFR BLD CALC: 49.6 % — SIGNIFICANT CHANGE UP (ref 39–50)
HGB BLD-MCNC: 16.2 G/DL — SIGNIFICANT CHANGE UP (ref 13–17)
MAGNESIUM SERPL-MCNC: 2.2 MG/DL — SIGNIFICANT CHANGE UP (ref 1.6–2.6)
MCHC RBC-ENTMCNC: 29.9 PG — SIGNIFICANT CHANGE UP (ref 27–34)
MCHC RBC-ENTMCNC: 32.7 GM/DL — SIGNIFICANT CHANGE UP (ref 32–36)
MCV RBC AUTO: 91.5 FL — SIGNIFICANT CHANGE UP (ref 80–100)
NRBC # BLD: 0 /100 WBCS — SIGNIFICANT CHANGE UP (ref 0–0)
PHOSPHATE SERPL-MCNC: 3.3 MG/DL — SIGNIFICANT CHANGE UP (ref 2.5–4.5)
PLATELET # BLD AUTO: 312 K/UL — SIGNIFICANT CHANGE UP (ref 150–400)
POTASSIUM SERPL-MCNC: 4.4 MMOL/L — SIGNIFICANT CHANGE UP (ref 3.5–5.3)
POTASSIUM SERPL-SCNC: 4.4 MMOL/L — SIGNIFICANT CHANGE UP (ref 3.5–5.3)
RBC # BLD: 5.42 M/UL — SIGNIFICANT CHANGE UP (ref 4.2–5.8)
RBC # FLD: 15.1 % — HIGH (ref 10.3–14.5)
SODIUM SERPL-SCNC: 139 MMOL/L — SIGNIFICANT CHANGE UP (ref 135–145)
WBC # BLD: 12.96 K/UL — HIGH (ref 3.8–10.5)
WBC # FLD AUTO: 12.96 K/UL — HIGH (ref 3.8–10.5)

## 2021-06-25 PROCEDURE — 86769 SARS-COV-2 COVID-19 ANTIBODY: CPT

## 2021-06-25 PROCEDURE — 36415 COLL VENOUS BLD VENIPUNCTURE: CPT

## 2021-06-25 PROCEDURE — 86900 BLOOD TYPING SEROLOGIC ABO: CPT

## 2021-06-25 PROCEDURE — 86901 BLOOD TYPING SEROLOGIC RH(D): CPT

## 2021-06-25 PROCEDURE — 80048 BASIC METABOLIC PNL TOTAL CA: CPT

## 2021-06-25 PROCEDURE — 88304 TISSUE EXAM BY PATHOLOGIST: CPT

## 2021-06-25 PROCEDURE — 71045 X-RAY EXAM CHEST 1 VIEW: CPT

## 2021-06-25 PROCEDURE — 99285 EMERGENCY DEPT VISIT HI MDM: CPT

## 2021-06-25 PROCEDURE — 84100 ASSAY OF PHOSPHORUS: CPT

## 2021-06-25 PROCEDURE — 83735 ASSAY OF MAGNESIUM: CPT

## 2021-06-25 PROCEDURE — 86850 RBC ANTIBODY SCREEN: CPT

## 2021-06-25 PROCEDURE — 85027 COMPLETE CBC AUTOMATED: CPT

## 2021-06-25 RX ORDER — DOCUSATE SODIUM 100 MG
1 CAPSULE ORAL
Qty: 14 | Refills: 0
Start: 2021-06-25 | End: 2021-07-01

## 2021-06-25 RX ORDER — ASPIRIN/CALCIUM CARB/MAGNESIUM 324 MG
0 TABLET ORAL
Qty: 0 | Refills: 0 | DISCHARGE

## 2021-06-25 RX ADMIN — HEPARIN SODIUM 5000 UNIT(S): 5000 INJECTION INTRAVENOUS; SUBCUTANEOUS at 07:15

## 2021-06-25 RX ADMIN — AMLODIPINE BESYLATE 5 MILLIGRAM(S): 2.5 TABLET ORAL at 05:11

## 2021-06-25 NOTE — DISCHARGE NOTE NURSING/CASE MANAGEMENT/SOCIAL WORK - NSDCPEWEB_GEN_ALL_CORE
[facility] for Tobacco Control website --- [email or web address] --- [email or web address]

## 2021-06-25 NOTE — DISCHARGE NOTE PROVIDER - HOSPITAL COURSE
80M with a pmh of HTN, HLD, COPD (not on home O2), diverticulitis (last attack 7 years ago) and prostate ca s/p total prostatectomy (~25years ago) and psh of umbilical hernia repair with mesh (2 years ago) who presented on 6/25 with 1 day history of RLQ pain with associated anorexia. Vitals were notable for  otherwise afebrile with SBP wnl. Initial labs notable for WBC 11.6 and Hgb 17.8, otherwise remaining labs wnl. CT A/P notable for appendix is dilated measuring up to 1.1 cm in transverse diameter. There is mild periappendiceal fat stranding. No free air or abscess. Findings c/w with acute appendicis. On 6/25 pt underwent lap appy (nonperf/nongang). His postoperative course was unremarkable with advancement of diet, passing trial of void, and pain control. On day of discharge patient was stable to be d/c'd home.

## 2021-06-25 NOTE — PROGRESS NOTE ADULT - ASSESSMENT
80M with a pmh of HTN, HLD, COPD (not on home O2), diverticulitis (last attack 7 years ago) and prostate ca s/p total prostatectomy (~25years ago) and psh of umbilical hernia repair with mesh (2 years ago) who presents with 1 day history of RLQ pain with associated anorexia. Vitals were notable for  otherwise afebrile with SBP wnl. Initial labs notable for WBC 11.6 and Hgb 17.8, otherwise remaining labs wnl. CT A/P notable for appendix is dilated measuring up to 1.1 cm in transverse diameter. There is mild periappendiceal fat stranding. No free air or abscess. There are several nonspecific mildly dilated loops of small bowel measuring up to 3.1 cm without a discrete transition point. Findings c/w with acute appendicitis.     NPO/IVF  Roceph/flagyl  Strict I&Os  AM labs   Analgesics PRN  Antiemetics PRN  VTE PPX with SCDs and SQH  OR for lap appy  
80M with a pmh of HTN, HLD, COPD (not on home O2), diverticulitis (last attack 7 years ago) and prostate ca s/p total prostatectomy (~25years ago) and psh of umbilical hernia repair with mesh (2 years ago) who presents with 1 day history of RLQ pain with associated anorexia. Vitals were notable for  otherwise afebrile with SBP wnl. Initial labs notable for WBC 11.6 and Hgb 17.8, otherwise remaining labs wnl. CT A/P notable for appendix is dilated measuring up to 1.1 cm in transverse diameter. There is mild periappendiceal fat stranding. No free air or abscess. There are several nonspecific mildly dilated loops of small bowel measuring up to 3.1 cm without a discrete transition point. Findings c/w with acute appendicis now s/p lap appy (nonperf/nongang)    Reg diet  Pain/nausea control  no abx  OOBA/SCD/IS/SQH  AM labs  
80M with a pmh of HTN, HLD, COPD (not on home O2), diverticulitis (last attack 7 years ago) and prostate ca s/p total prostatectomy (~25years ago) and psh of umbilical hernia repair with mesh (2 years ago) who presents with 1 day history of RLQ pain with associated anorexia. Vitals were notable for  otherwise afebrile with SBP wnl. Initial labs notable for WBC 11.6 and Hgb 17.8, otherwise remaining labs wnl. CT A/P notable for appendix is dilated measuring up to 1.1 cm in transverse diameter. There is mild periappendiceal fat stranding. No free air or abscess. There are several nonspecific mildly dilated loops of small bowel measuring up to 3.1 cm without a discrete transition point. Findings c/w with acute appendicis now s/p lap appy (nonperf/nongang)    Reg diet  Pain/nausea control  no abx  home Amlodopine/atorvustatin  OOBA/SCD/IS/SQH  AM labs

## 2021-06-25 NOTE — DISCHARGE NOTE PROVIDER - NSDCFUADDINST_GEN_ALL_CORE_FT
Please follow up with Dr. De La Paz in one week; you may call the office to make an appointment at your earliest convenience at [phone].    General Discharge Instructions:  Please resume all regular home medications unless specifically advised not to take a particular medication. Also, please take any new medications as prescribed. Take 2 tabs tylenol every 6 hours as needed for pain management. You have also been prescribed percocet for pain not controlled by tylenol. Take 1 tabs as prescribed instead of tylenol for severe pain. Take prescribed stool softener (colace) to prevent constipation caused by percocet.  Please get plenty of rest, continue to ambulate several times per day, and drink adequate amounts of fluids. Avoid lifting weights greater than 5-10 lbs until you follow-up with your surgeon, who will instruct you further regarding activity restrictions.  Avoid driving or operating heavy machinery while taking pain medications.  Please follow-up with your surgeon and Primary Care Provider (PCP) as advised.  Incision Care:  *Please call your doctor if you have increased pain, swelling, redness, or drainage from the incision site.  *Avoid swimming and baths until your follow-up appointment.  *You may shower, and wash surgical incisions with a mild soap and warm water. Gently pat the area dry.  *If you have staples, they will be removed at your follow-up appointment.  *If you have steri-strips, they will fall off on their own. Please remove any remaining strips 7-10 days after surgery.    Warning Signs:  Please call your doctor if you experience the following:  *You experience new chest pain, pressure, squeezing or tightness.  *New or worsening cough, shortness of breath, or wheeze.  *If you are vomiting and cannot keep down fluids or your medications.  *You are getting dehydrated due to continued vomiting, diarrhea, or other reasons. Signs of dehydration include dry mouth, rapid heartbeat, or feeling dizzy or faint when standing.  *You see blood or dark/black material when you vomit or have a bowel movement.  *You experience burning when you urinate, have blood in your urine, or experience a discharge.  *Your pain is not improving within 8-12 hours or is not gone within 24 hours. Call or return immediately if your pain is getting worse, changes location, or moves to your chest or back.  *You have shaking chills, or fever greater than 101.5 degrees Fahrenheit or 38 degrees Celsius.  *Any change in your symptoms, or any new symptoms that concern you.     Please follow up with Dr. De La Paz in one week; you may call the office to make an appointment at your earliest convenience at [phone].    General Discharge Instructions:  Please resume all regular home medications unless specifically advised not to take a particular medication. Please restart your ASA on 6/28/21. Also, please take any new medications as prescribed. Take 2 tabs tylenol every 6 hours as needed for pain management. You have also been prescribed percocet for pain not controlled by tylenol. Take 1 tabs as prescribed instead of tylenol for severe pain. Take prescribed stool softener (colace) to prevent constipation caused by percocet.  Please get plenty of rest, continue to ambulate several times per day, and drink adequate amounts of fluids. Avoid lifting weights greater than 5-10 lbs until you follow-up with your surgeon, who will instruct you further regarding activity restrictions.  Avoid driving or operating heavy machinery while taking pain medications.  Please follow-up with your surgeon and Primary Care Provider (PCP) as advised.  Incision Care:  *Please call your doctor if you have increased pain, swelling, redness, or drainage from the incision site.  *Avoid swimming and baths until your follow-up appointment.  *You may shower, and wash surgical incisions with a mild soap and warm water. Gently pat the area dry.  *If you have staples, they will be removed at your follow-up appointment.  *If you have steri-strips, they will fall off on their own. Please remove any remaining strips 7-10 days after surgery.    Warning Signs:  Please call your doctor if you experience the following:  *You experience new chest pain, pressure, squeezing or tightness.  *New or worsening cough, shortness of breath, or wheeze.  *If you are vomiting and cannot keep down fluids or your medications.  *You are getting dehydrated due to continued vomiting, diarrhea, or other reasons. Signs of dehydration include dry mouth, rapid heartbeat, or feeling dizzy or faint when standing.  *You see blood or dark/black material when you vomit or have a bowel movement.  *You experience burning when you urinate, have blood in your urine, or experience a discharge.  *Your pain is not improving within 8-12 hours or is not gone within 24 hours. Call or return immediately if your pain is getting worse, changes location, or moves to your chest or back.  *You have shaking chills, or fever greater than 101.5 degrees Fahrenheit or 38 degrees Celsius.  *Any change in your symptoms, or any new symptoms that concern you.

## 2021-06-25 NOTE — DISCHARGE NOTE NURSING/CASE MANAGEMENT/SOCIAL WORK - NSDCPEEMAIL_GEN_ALL_CORE
[facility] for Tobacco Control email [email or web address]

## 2021-06-25 NOTE — DISCHARGE NOTE PROVIDER - NSDCMRMEDTOKEN_GEN_ALL_CORE_FT
aspirin 81 mg oral tablet: orally once a day  Colace 100 mg oral capsule: 1 cap(s) orally 2 times a day as needed for constipation  irbesartan 300 mg oral tablet: 1 tab(s) orally once a day  Norvasc 5 mg oral tablet: 1 tab(s) orally once a day  oxycodone-acetaminophen 5 mg-325 mg oral tablet: 1 tab(s) orally every 6 hours as needed for severe pain MDD:4  rosuvastatin 5 mg oral capsule: 1 cap(s) orally once a day   Colace 100 mg oral capsule: 1 cap(s) orally 2 times a day as needed for constipation  irbesartan 300 mg oral tablet: 1 tab(s) orally once a day  Norvasc 5 mg oral tablet: 1 tab(s) orally once a day  oxycodone-acetaminophen 5 mg-325 mg oral tablet: 1 tab(s) orally every 6 hours as needed for severe pain MDD:4  rosuvastatin 5 mg oral capsule: 1 cap(s) orally once a day

## 2021-06-25 NOTE — DISCHARGE NOTE NURSING/CASE MANAGEMENT/SOCIAL WORK - PATIENT PORTAL LINK FT
You can access the FollowMyHealth Patient Portal offered by [facility] by registering at the following website: [email or web address]. By joining [employer]’s FollowMyHealth portal, you will also be able to view your health information using other applications (apps) compatible with our system.

## 2021-06-25 NOTE — DISCHARGE NOTE PROVIDER - CARE PROVIDER_API CALL
Romain De La Paz  SURGERY  [address] [place] [address]  Phone: [phone]  Fax: [phone]  Follow Up Time:

## 2021-06-25 NOTE — DISCHARGE NOTE PROVIDER - NSDCCPCAREPLAN_GEN_ALL_CORE_FT
PRINCIPAL DISCHARGE DIAGNOSIS  Diagnosis: Appendicitis  Assessment and Plan of Treatment: - s/p lap appy

## 2021-06-25 NOTE — PROGRESS NOTE ADULT - SUBJECTIVE AND OBJECTIVE BOX
24 hr events:  ON: Admitted. Added to OR    SUBJECTIVE:  Pt seen and examined by chief resident. Pt is doing well, resting comfortably on bed. Reports pain when standing. Last Bm yesterday AM. No nausea or vomiting. No complaints at this time.    Vital Signs Last 24 Hrs  T(C): 36.7 (24 Jun 2021 08:28), Max: 37 (23 Jun 2021 16:53)  T(F): 98.1 (24 Jun 2021 08:28), Max: 98.6 (23 Jun 2021 16:53)  HR: 69 (24 Jun 2021 08:28) (69 - 106)  BP: 149/82 (24 Jun 2021 08:28) (124/82 - 158/99)  RR: 14 (24 Jun 2021 08:28) (14 - 18)  SpO2: 96% (24 Jun 2021 08:28) (95% - 98%)    Physical Exam:  General: NAD  Pulmonary: Nonlabored breathing, no respiratory distress  Cardiovascular: NSR  Abdominal: soft, tender in the RLQ, distended (as per pt, baseline), no rebound or guarding.   Extremities: WWP, SCDs in place    I&O's Summary    23 Jun 2021 07:01  -  24 Jun 2021 07:00  --------------------------------------------------------  IN: 1340 mL / OUT: 750 mL / NET: 590 mL        LABS:                        16.6   9.15  )-----------( 268      ( 24 Jun 2021 08:32 )             51.0     06-24    140  |  106  |  14  ----------------------------<  103<H>  4.6   |  24  |  0.96    Ca    8.6      24 Jun 2021 08:32  Phos  3.0     06-24  Mg     2.2     06-24    TPro  7.9  /  Alb  4.1  /  TBili  0.8  /  DBili  x   /  AST  23  /  ALT  41  /  AlkPhos  81  06-23    PT/INR - ( 23 Jun 2021 13:35 )   PT: 12.3 sec;   INR: 1.04          PTT - ( 23 Jun 2021 13:35 )  PTT:42.4 sec  Urinalysis Basic - ( 23 Jun 2021 14:36 )    Color: Yellow / Appearance: Clear / SG: <=1.005 / pH: x  Gluc: x / Ketone: NEGATIVE  / Bili: NEGATIVE / Urobili: 0.2 E.U./dL   Blood: x / Protein: NEGATIVE mg/dL / Nitrite: NEGATIVE   Leuk Esterase: NEGATIVE / RBC: x / WBC x   Sq Epi: x / Non Sq Epi: x / Bacteria: x    LIVER FUNCTIONS - ( 23 Jun 2021 13:35 )  Alb: 4.1 g/dL / Pro: 7.9 g/dL / ALK PHOS: 81 U/L / ALT: 41 U/L / AST: 23 U/L / GGT: x           
24 hr events:  ON: HLIV. Christiana diet. Passed TOV  6/24:s/p lap appy (nonperf/nongang)POC wnl    SUBJECTIVE:  Pt seen and examined by chief resident. Pt is doing well, resting comfortably on bed. Pain controlled. Diet tolerated. Ambulating out of bed. No nausea or vomiting. No complaints at this time.      Vital Signs Last 24 Hrs  T(C): 36.4 (25 Jun 2021 05:10), Max: 36.7 (24 Jun 2021 07:37)  T(F): 97.5 (25 Jun 2021 05:10), Max: 98.1 (24 Jun 2021 07:37)  HR: 79 (25 Jun 2021 05:10) (69 - 86)  BP: 124/80 (25 Jun 2021 05:10) (124/80 - 149/82)  BP(mean): 102 (24 Jun 2021 16:06) (101 - 111)  RR: 16 (25 Jun 2021 05:10) (14 - 21)  SpO2: 99% (25 Jun 2021 05:10) (95% - 99%)    Physical Exam:  General: NAD  Pulmonary: Nonlabored breathing, no respiratory distress  Cardiovascular: NSR  Abdominal: soft, NT/ND, incision clean dry and intact  Extremities: WWP, SCDs in place    I&O's Summary    24 Jun 2021 07:01  -  25 Jun 2021 07:00  --------------------------------------------------------  IN: 130 mL / OUT: 1900 mL / NET: -1770 mL        LABS:                        16.2   12.96 )-----------( 312      ( 25 Jun 2021 06:37 )             49.6     06-24    140  |  106  |  14  ----------------------------<  103<H>  4.6   |  24  |  0.96    Ca    8.6      24 Jun 2021 08:32  Phos  3.0     06-24  Mg     2.2     06-24    TPro  7.9  /  Alb  4.1  /  TBili  0.8  /  DBili  x   /  AST  23  /  ALT  41  /  AlkPhos  81  06-23    PT/INR - ( 23 Jun 2021 13:35 )   PT: 12.3 sec;   INR: 1.04          PTT - ( 23 Jun 2021 13:35 )  PTT:42.4 sec  Urinalysis Basic - ( 23 Jun 2021 14:36 )    Color: Yellow / Appearance: Clear / SG: <=1.005 / pH: x  Gluc: x / Ketone: NEGATIVE  / Bili: NEGATIVE / Urobili: 0.2 E.U./dL   Blood: x / Protein: NEGATIVE mg/dL / Nitrite: NEGATIVE   Leuk Esterase: NEGATIVE / RBC: x / WBC x   Sq Epi: x / Non Sq Epi: x / Bacteria: x      CAPILLARY BLOOD GLUCOSE        LIVER FUNCTIONS - ( 23 Jun 2021 13:35 )  Alb: 4.1 g/dL / Pro: 7.9 g/dL / ALK PHOS: 81 U/L / ALT: 41 U/L / AST: 23 U/L / GGT: x             RADIOLOGY & ADDITIONAL STUDIES:    
Team 4 Surgery Post-Op Note, PCN:     Pre-Op Dx: appendicitis  Procedure: Laparoscopic appendectomy      Surgeon: Brain    Subjective: Pt seen and examined at bedside 2 hours post-op. Pt is doing well, resting in bed. Pt denies abdominal pain, CP, SOB, nausea, vomiting, leg pain/cramping.    Vital Signs Last 24 Hrs  T(C): 36.7 (24 Jun 2021 17:18), Max: 36.8 (23 Jun 2021 19:40)  T(F): 98 (24 Jun 2021 17:18), Max: 98.2 (23 Jun 2021 19:40)  HR: 75 (24 Jun 2021 17:18) (69 - 106)  BP: 147/89 (24 Jun 2021 17:18) (124/82 - 149/82)  BP(mean): 102 (24 Jun 2021 16:06) (101 - 111)  RR: 18 (24 Jun 2021 17:18) (14 - 21)  SpO2: 97% (24 Jun 2021 17:18) (95% - 98%)    Physical Exam:  General: NAD, resting comfortably in bed  Pulmonary: Nonlabored breathing, no respiratory distress  Cardiovascular: NSR  Abdominal: soft, NT, baseline distended body habitus, incisions clean dry and intact  Extremities: WWP, normal strength      LABS:                        16.6   9.15  )-----------( 268      ( 24 Jun 2021 08:32 )             51.0     06-24    140  |  106  |  14  ----------------------------<  103<H>  4.6   |  24  |  0.96    Ca    8.6      24 Jun 2021 08:32  Phos  3.0     06-24  Mg     2.2     06-24    TPro  7.9  /  Alb  4.1  /  TBili  0.8  /  DBili  x   /  AST  23  /  ALT  41  /  AlkPhos  81  06-23    PT/INR - ( 23 Jun 2021 13:35 )   PT: 12.3 sec;   INR: 1.04          PTT - ( 23 Jun 2021 13:35 )  PTT:42.4 sec  CAPILLARY BLOOD GLUCOSE        Urinalysis Basic - ( 23 Jun 2021 14:36 )  Color: Yellow / Appearance: Clear / SG: <=1.005 / pH: x  Gluc: x / Ketone: NEGATIVE  / Bili: NEGATIVE / Urobili: 0.2 E.U./dL   Blood: x / Protein: NEGATIVE mg/dL / Nitrite: NEGATIVE   Leuk Esterase: NEGATIVE / RBC: x / WBC x   Sq Epi: x / Non Sq Epi: x / Bacteria: x      LIVER FUNCTIONS - ( 23 Jun 2021 13:35 )  Alb: 4.1 g/dL / Pro: 7.9 g/dL / ALK PHOS: 81 U/L / ALT: 41 U/L / AST: 23 U/L / GGT: x           ABO Interpretation: O (06-23 @ 22:02)

## 2021-07-01 DIAGNOSIS — Z85.46 PERSONAL HISTORY OF MALIGNANT NEOPLASM OF PROSTATE: ICD-10-CM

## 2021-07-01 DIAGNOSIS — F17.210 NICOTINE DEPENDENCE, CIGARETTES, UNCOMPLICATED: ICD-10-CM

## 2021-07-01 DIAGNOSIS — E86.0 DEHYDRATION: ICD-10-CM

## 2021-07-01 DIAGNOSIS — J44.9 CHRONIC OBSTRUCTIVE PULMONARY DISEASE, UNSPECIFIED: ICD-10-CM

## 2021-07-01 DIAGNOSIS — K57.30 DIVERTICULOSIS OF LARGE INTESTINE WITHOUT PERFORATION OR ABSCESS WITHOUT BLEEDING: ICD-10-CM

## 2021-07-01 DIAGNOSIS — R10.9 UNSPECIFIED ABDOMINAL PAIN: ICD-10-CM

## 2021-07-01 DIAGNOSIS — E78.5 HYPERLIPIDEMIA, UNSPECIFIED: ICD-10-CM

## 2021-07-01 DIAGNOSIS — I10 ESSENTIAL (PRIMARY) HYPERTENSION: ICD-10-CM

## 2021-07-01 DIAGNOSIS — Z79.82 LONG TERM (CURRENT) USE OF ASPIRIN: ICD-10-CM

## 2021-07-01 DIAGNOSIS — F12.99 CANNABIS USE, UNSPECIFIED WITH UNSPECIFIED CANNABIS-INDUCED DISORDER: ICD-10-CM

## 2021-07-01 DIAGNOSIS — K35.890 OTHER ACUTE APPENDICITIS WITHOUT PERFORATION OR GANGRENE: ICD-10-CM

## 2021-07-01 DIAGNOSIS — E66.9 OBESITY, UNSPECIFIED: ICD-10-CM

## 2021-07-01 DIAGNOSIS — D75.1 SECONDARY POLYCYTHEMIA: ICD-10-CM

## 2021-07-06 LAB — SURGICAL PATHOLOGY STUDY: SIGNIFICANT CHANGE UP

## 2022-06-13 NOTE — ED ADULT NURSE NOTE - NS ED NURSE LEVEL OF CONSCIOUSNESS AFFECT
Calm
